# Patient Record
Sex: FEMALE | Race: ASIAN | NOT HISPANIC OR LATINO
[De-identification: names, ages, dates, MRNs, and addresses within clinical notes are randomized per-mention and may not be internally consistent; named-entity substitution may affect disease eponyms.]

---

## 2022-02-28 ENCOUNTER — APPOINTMENT (OUTPATIENT)
Dept: NEUROLOGY | Facility: CLINIC | Age: 67
End: 2022-02-28

## 2022-07-15 ENCOUNTER — APPOINTMENT (OUTPATIENT)
Dept: NEUROLOGY | Facility: CLINIC | Age: 67
End: 2022-07-15

## 2022-07-15 VITALS
DIASTOLIC BLOOD PRESSURE: 76 MMHG | HEART RATE: 55 BPM | BODY MASS INDEX: 24.19 KG/M2 | SYSTOLIC BLOOD PRESSURE: 130 MMHG | WEIGHT: 120 LBS | HEIGHT: 59 IN

## 2022-07-15 DIAGNOSIS — F32.A DEPRESSION, UNSPECIFIED: ICD-10-CM

## 2022-07-15 DIAGNOSIS — I10 ESSENTIAL (PRIMARY) HYPERTENSION: ICD-10-CM

## 2022-07-15 DIAGNOSIS — Z87.891 PERSONAL HISTORY OF NICOTINE DEPENDENCE: ICD-10-CM

## 2022-07-15 DIAGNOSIS — H26.9 UNSPECIFIED CATARACT: ICD-10-CM

## 2022-07-15 DIAGNOSIS — Z83.2 FAMILY HISTORY OF DISEASES OF THE BLOOD AND BLOOD-FORMING ORGANS AND CERTAIN DISORDERS INVOLVING THE IMMUNE MECHANISM: ICD-10-CM

## 2022-07-15 DIAGNOSIS — E78.5 HYPERLIPIDEMIA, UNSPECIFIED: ICD-10-CM

## 2022-07-15 PROCEDURE — 99204 OFFICE O/P NEW MOD 45 MIN: CPT

## 2022-07-15 RX ORDER — PREDNISOLONE ACETATE 10 MG/ML
1 SUSPENSION/ DROPS OPHTHALMIC
Qty: 15 | Refills: 0 | Status: ACTIVE | COMMUNITY
Start: 2022-06-22

## 2022-07-15 RX ORDER — FLUOXETINE HYDROCHLORIDE 20 MG/1
20 CAPSULE ORAL
Qty: 45 | Refills: 0 | Status: ACTIVE | COMMUNITY
Start: 2022-02-04

## 2022-07-15 RX ORDER — SIMVASTATIN 20 MG/1
20 TABLET, FILM COATED ORAL
Qty: 90 | Refills: 0 | Status: ACTIVE | COMMUNITY
Start: 2022-06-13

## 2022-07-15 RX ORDER — KETOROLAC TROMETHAMINE 5 MG/ML
0.5 SOLUTION OPHTHALMIC
Qty: 10 | Refills: 0 | Status: ACTIVE | COMMUNITY
Start: 2022-06-22

## 2022-07-15 RX ORDER — HYDROCHLOROTHIAZIDE 12.5 MG/1
12.5 TABLET ORAL
Qty: 90 | Refills: 0 | Status: ACTIVE | COMMUNITY
Start: 2022-03-11

## 2022-07-15 RX ORDER — METOPROLOL SUCCINATE 25 MG/1
25 TABLET, EXTENDED RELEASE ORAL
Qty: 90 | Refills: 0 | Status: ACTIVE | COMMUNITY
Start: 2022-03-01

## 2022-07-15 RX ORDER — OFLOXACIN 3 MG/ML
0.3 SOLUTION/ DROPS OPHTHALMIC
Qty: 10 | Refills: 0 | Status: ACTIVE | COMMUNITY
Start: 2022-06-22

## 2022-07-15 NOTE — ASSESSMENT
[FreeTextEntry1] : Assessment/Plan:\par  67 year old female with 1 year hx of daily left sided headaches. No inciting event. NO migrainous or autonomic features. \par \par New onset left sided headaches- most likely 2/2 cervicalgia. However, given her age, would like to rule out intracranial pathologies. \par \par Plan:-\par [] MRI brain without contrast and MRA head without contrast to rule out intracranial pathology to explain a secondary headache\par [] Will refer to physical therapy for musculoskeletal neck and shoulder pain\par [] Tylenol / Ibuprofen as needed for pain relief\par [] Can try OTC creams (Icy hot, Aspercreme) over shoulder and neck\par [] Avoid sleeping on the left side\par [] Continue massage therapies\par \par \par Headache education provided:\par [] Stay well hydrated\par [] Limit excessive caffeine and alcohol intake\par [] Maintain good sleep hygiene\par [] Try to avoid triggers; Lifestyle modifications\par [] Practice good eating habits\par [] Avoid excessive use of over the counter pain medications, as they can cause medication overuse headaches \par [] Keep a headache diary\par [] Relaxation techniques, biofeedback, massage therapy, acupunctures, and heating pads may be effective\par \par Return to clinic 3 months\par \par The above plan was discussed with ELOY MARTINEZ in great detail.  ELOY MARTINEZ verbalized understanding and agrees with plan as detailed above. Patient was provided education and counselling on current diagnosis/symptoms, diagnostic work up, treatment options and potential side effects of any prescribed therapy/therapies. She was advised to call our clinic at [phone] for any new or worsening symptoms, or with any questions or concerns. In case of acute onset of neurological symptoms or worsening presentation, patient was advised to present to nearest emergency room for further evaluation. ELOY MARTINEZ expressed understanding and all her questions/concerns were addressed.\par \par Erin Antunez M.D\par

## 2022-07-15 NOTE — PHYSICAL EXAM
[FreeTextEntry1] : PHYSICAL EXAM\par Constitutional: Alert, no acute distress \par Neck: Full range of motion. Tenderness over muscles over left shoulder and left side of neck to palpation\par Psychiatric: appropriate affect and mood\par Pulmonary: No respiratory distress, stable on room air\par \par NEUROLOGICAL EXAM\par Mental status: The patient is alert, attentive, and conversational memory intact\par Speech/language: Clear and fluent with intact comprehension\par Cranial nerves:\par CN II: Visual fields are full to confrontation. Pupil size equal and briskly reactive to light. \par CN III, IV, VI: EOMI, no nystagmus, no ptosis\par CN V: Facial sensation is intact to pinprick in all 3 divisions bilaterally.\par CN VII: Face is symmetric with normal eye closure and smile.\par CN VII: Hearing is normal to rubbing fingers\par CN IX, X: Palate elevates symmetrically. Phonation is normal.\par CN XI: Head turning and shoulder shrug are intact\par CN XII: Tongue is midline with normal movements and no atrophy.\par Motor: Strength is full bilaterally. 5/5 muscle power in bilateral UE and LE.\par Reflexes: Reflexes are 2+ and symmetric at the biceps, knees, and ankles. Plantar responses are flexor.\par Sensory: Intact sensations to light touch in upper and lower extremities. \par Coordination: Rapid alternating movements and fine finger movements are intact. There is no dysmetria on finger-to-nose. There are no abnormal or extraneous movements. \par Gait/Stance: Posture is normal. Gait is steady with normal steps, base, arm swing, and turning.\par \par \par \par

## 2022-07-15 NOTE — HISTORY OF PRESENT ILLNESS
[FreeTextEntry1] : HPI (initial visit Jul 15, 2022)- 68 yo right handed female w/ hx of HTN, HLD, Depression, cataract here for evaluation of headaches. \par \par She reports 1 year hx of left sided headaches. They have been daily, comes and goes through out the day. Tylenol or massage would help. Throbbing/aching pain. No sharp pains. No prior of hx of headaches. No hx of COVID-19 infection. No nausea or vomiting. Pain at back of neck on the left and left shoulder. "I have also been told I have a bad posture". Does wake her up in the middle of night. NO morning headaches. No associated dizziness or vision changes. No known triggers. No unilateral ptosis, eye tearing, runny nose, facial flushing or conjunctival injection. She can function with a headache, pressing on headache helps. "I think I sleep very well". She does snore. Headaches morning in afternoons. \par \par No known family hx of headaches. NO hx of head trauma. \par

## 2022-08-13 ENCOUNTER — APPOINTMENT (OUTPATIENT)
Dept: MRI IMAGING | Facility: CLINIC | Age: 67
End: 2022-08-13

## 2022-08-13 ENCOUNTER — OUTPATIENT (OUTPATIENT)
Dept: OUTPATIENT SERVICES | Facility: HOSPITAL | Age: 67
LOS: 1 days | End: 2022-08-13
Payer: MEDICARE

## 2022-08-13 DIAGNOSIS — R51.9 HEADACHE, UNSPECIFIED: ICD-10-CM

## 2022-08-13 PROCEDURE — 70551 MRI BRAIN STEM W/O DYE: CPT | Mod: 26,MH

## 2022-08-13 PROCEDURE — 70544 MR ANGIOGRAPHY HEAD W/O DYE: CPT

## 2022-08-13 PROCEDURE — 70544 MR ANGIOGRAPHY HEAD W/O DYE: CPT | Mod: 26,59,MH

## 2022-08-13 PROCEDURE — 70551 MRI BRAIN STEM W/O DYE: CPT

## 2022-08-16 ENCOUNTER — TRANSCRIPTION ENCOUNTER (OUTPATIENT)
Age: 67
End: 2022-08-16

## 2022-08-16 ENCOUNTER — NON-APPOINTMENT (OUTPATIENT)
Age: 67
End: 2022-08-16

## 2022-10-13 ENCOUNTER — TRANSCRIPTION ENCOUNTER (OUTPATIENT)
Age: 67
End: 2022-10-13

## 2022-11-04 ENCOUNTER — APPOINTMENT (OUTPATIENT)
Dept: NEUROLOGY | Facility: CLINIC | Age: 67
End: 2022-11-04

## 2022-11-04 VITALS
DIASTOLIC BLOOD PRESSURE: 77 MMHG | BODY MASS INDEX: 24.39 KG/M2 | WEIGHT: 121 LBS | SYSTOLIC BLOOD PRESSURE: 138 MMHG | HEIGHT: 59 IN | HEART RATE: 63 BPM

## 2022-11-04 DIAGNOSIS — R51.9 HEADACHE, UNSPECIFIED: ICD-10-CM

## 2022-11-04 DIAGNOSIS — M54.2 CERVICALGIA: ICD-10-CM

## 2022-11-04 DIAGNOSIS — R23.8 OTHER SKIN CHANGES: ICD-10-CM

## 2022-11-04 PROCEDURE — 99212 OFFICE O/P EST SF 10 MIN: CPT

## 2022-11-04 NOTE — DATA REVIEWED
[de-identified] : MRA H 8/13/2022 with no evidence of aneurysm. There is mild narrowing at distal L ICA and distal R vert.\par \par MRI brain no contrast 8/13/2022 with evidence of subcortical and periventricular white matter lesions, most of it of which could be explained by chronic microvascular ischemic changes (hx of HTN, HLD), however some of the periventricular lesions (ethel R posterior horn of latera ventricule) and confluent left frontal lobe lesion could be more suspicious for an inflammatory/demyelinating condition. She denies any hx of neurological deficits in the past. Will plan to repeat MRI brain w/w/o in 1 year.

## 2022-11-04 NOTE — ASSESSMENT
[FreeTextEntry1] : Assessment/Plan:\par 1. Cervicalgia- significantly better. Headaches now 1/week and better managed. Recommend continuing yoga and stretching exercises. Continue psychotherapy.  Tylenol / Ibuprofen as needed for pain relief. PT was very helpful. \par Can try OTC creams (Icy hot, Aspercreme) over shoulder and neck. Avoid sleeping on the left side. Continue massage therapies.\par Repeat MRI brain w/w/o in 1 year for stability of white matter disease. \par Headache education provided:\par [] Stay well hydrated\par [] Limit excessive caffeine and alcohol intake\par [] Maintain good sleep hygiene. Follow a consistent sleep and wake schedule. \par [] Practice good eating habits. Avoid skipping meals. \par [] Try to avoid any known triggers\par [] Avoid excessive use of over the counter pain medications, as they can cause medication overuse headaches \par [] Keep a headache diary\par [] Relaxation techniques, biofeedback, massage therapy, acupunctures, and heating pads may be effective\par \par 2. Scalp itching- Will refer to dermatology\par \par Return to clinic 3 months\par \par The above plan was discussed with ELOY AMRTINEZ in great detail.  ELOY MARTINEZ verbalized understanding and agrees with plan as detailed above. Patient was provided education and counselling on current diagnosis/symptoms, diagnostic work up, treatment options and potential side effects of any prescribed therapy/therapies. She was advised to call our clinic at [phone] for any new or worsening symptoms, or with any questions or concerns. In case of acute onset of neurological symptoms or worsening presentation, patient was advised to present to nearest emergency room for further evaluation. ELOY MARTINEZ expressed understanding and all her questions/concerns were addressed.\par \par Erin Antunez M.D\par

## 2022-11-04 NOTE — PHYSICAL EXAM
[FreeTextEntry1] : Some limitations in movements of the neck side to side. Tightness felt over muscles around neck/shoulder. \par Strength is full bilaterally. 5/5 muscle power in bilateral UE and LE.\par Reflexes are 2+ and symmetric at the biceps, knees, and ankles. Plantar responses are flexor.\par Intact sensations to light touch in upper and lower extremities. \par Posture is normal. Gait is steady with normal steps, base, arm swing, and turning.

## 2022-11-04 NOTE — HISTORY OF PRESENT ILLNESS
[FreeTextEntry1] : INTERIM HX 11/04/2022: "Headaches are well controlled". Headaches now 1x/week, though mild. GOt a back brace. PT has helped. Doing yoga and exercises. Doing psychotherapy, it helps tremendously. Patient brings up an unrelated issues: scalp itching. \par \par MRA H 8/13/2022 with no evidence of aneurysm. There is mild narrowing at distal L ICA and distal R vert. MRI brain no contrast 8/13/2022 with evidence of subcortical and periventricular white matter lesions, most of it of which could be explained by chronic microvascular ischemic changes (hx of HTN, HLD), however some of the periventricular lesions (ethel R posterior horn of latera/ ventricle) and confluent left frontal lobe lesion could be more suspicious for an inflammatory/demyelinating condition. She denies any hx of neurological deficits in the past. Will plan to repeat MRI brain w/w/o in 1 year.\par \par -----------------------------------------------------\par HPI (initial visit Jul 15, 2022)- 66 yo right handed female w/ hx of HTN, HLD, Depression, cataract here for evaluation of headaches. \par \par She reports 1 year hx of left sided headaches. They have been daily, comes and goes through out the day. Tylenol or massage would help. Throbbing/aching pain. No sharp pains. No prior of hx of headaches. No hx of COVID-19 infection. No nausea or vomiting. Pain at back of neck on the left and left shoulder. "I have also been told I have a bad posture". Does wake her up in the middle of night. NO morning headaches. No associated dizziness or vision changes. No known triggers. No unilateral ptosis, eye tearing, runny nose, facial flushing or conjunctival injection. She can function with a headache, pressing on headache helps. "I think I sleep very well". She does snore. Headaches morning in afternoons. \par \par No known family hx of headaches. NO hx of head trauma. \par

## 2023-12-14 ENCOUNTER — APPOINTMENT (OUTPATIENT)
Dept: NEUROLOGY | Facility: CLINIC | Age: 68
End: 2023-12-14

## 2024-08-05 ENCOUNTER — NON-APPOINTMENT (OUTPATIENT)
Age: 69
End: 2024-08-05

## 2024-08-06 ENCOUNTER — APPOINTMENT (OUTPATIENT)
Dept: ORTHOPEDIC SURGERY | Facility: CLINIC | Age: 69
End: 2024-08-06

## 2024-08-06 PROBLEM — M16.11 ARTHRITIS OF RIGHT HIP: Status: ACTIVE | Noted: 2024-08-06

## 2024-08-06 PROBLEM — M25.551 RIGHT HIP PAIN: Status: ACTIVE | Noted: 2024-08-06

## 2024-08-06 PROCEDURE — 73502 X-RAY EXAM HIP UNI 2-3 VIEWS: CPT | Mod: RT

## 2024-08-06 PROCEDURE — 73564 X-RAY EXAM KNEE 4 OR MORE: CPT | Mod: RT

## 2024-08-06 PROCEDURE — 99204 OFFICE O/P NEW MOD 45 MIN: CPT

## 2024-08-06 NOTE — HISTORY OF PRESENT ILLNESS
[de-identified] : This is very nice 69-year-old female experiencing right hip and groin and thigh down to the knee pain, which is severe in intensity. The pain substantially limits activities of daily living. Walking tolerance is reduced.  She is a cane.  She does not take NSAIDs for this.  The patient denies any radiation of the pain to the feet and it is not associated with numbness, tingling, or weakness. Airway patent, Nasal mucosa clear. Mouth with normal mucosa. Throat has no vesicles, no oropharyngeal exudates and uvula is midline.

## 2024-08-06 NOTE — DISCUSSION/SUMMARY
[de-identified] :  This patient has right hip osteoarthritis that is severe radiating pain down to the knee.  The right knee examination is normal.  The patient is not an appropriate candidate for surgical intervention at this time as she has not yet tried a course of conservative management. An extensive discussion was conducted on the natural history of the disease and the variety of surgical and non-surgical options available to the patient including, but not limited to non-steroidal anti-inflammatory medications, steroid injections, physical therapy, maintenance of ideal body weight, and reduction of activity.  I recommend meloxicam for this diagnosis however she tells me that her primary care physician told her that she is not allowed to take NSAIDs because of her hypertension.  I prescribed a right hip intra-articular cortisone injection to be performed interventional radiology.  Physical therapy prescribed. The patient will schedule an appointment as needed.

## 2024-08-06 NOTE — PHYSICAL EXAM
[de-identified] : Patient is well nourished, well-developed, in no acute distress, with appropriate mood and affect. The patient is oriented to time, place, and person. Respirations are even and unlabored. Gait evaluation reveals a limp. There is no inguinal adenopathy. Examination of the contralateral hip shows normal range of motion, strength, no tenderness, and intact skin. The affected limb is well-perfused, shows a grossly normal motor and sensory examination. Examination of the hip shows no skin lesions. Hip motion is reduced and causes pain. FADIR is positive and LAUREN is positive. Stinchfield test is positive. Leg lengths are approximately equal. Both hips are stable and muscle strength is normal.  Right knee motion is painless and the knee moves from 0 to 135 degrees. The knee is stable within that range-of-motion to AP and ML stress with a 1A Lachman, negative anterior or posterior drawer and no instability to varus or valgus stress. The alignment of the knee is 5 degrees varus. No effusion or crepitus is noted. No tenderness to palpation about the medial or lateral joint line, medial or lateral tibial plateau, medial or lateral femoral condyle, medial or lateral patellar facets, superior or inferior pole of the patella. Leilani's is negative. Muscle strength is normal. Pedal pulses are palpable.  [de-identified] :  AP pelvis, AP and lateral hip radiographs of the right hip were ordered and taken in the office and demonstrate degenerative joint disease of the hip with joint space narrowing, osteophyte formation, and subchondral sclerosis.  Long standing knee, AP knee, lateral knee, and patellar views of the right knee were ordered and taken in the office and demonstrate no evidence of degenerative joint disease of the knee, fractures, intra-articular pathology.

## 2024-08-11 ENCOUNTER — NON-APPOINTMENT (OUTPATIENT)
Age: 69
End: 2024-08-11

## 2024-08-12 ENCOUNTER — APPOINTMENT (OUTPATIENT)
Dept: SPINE | Facility: CLINIC | Age: 69
End: 2024-08-12
Payer: MEDICARE

## 2024-08-12 VITALS
WEIGHT: 122 LBS | HEIGHT: 59 IN | HEART RATE: 57 BPM | SYSTOLIC BLOOD PRESSURE: 126 MMHG | DIASTOLIC BLOOD PRESSURE: 69 MMHG | OXYGEN SATURATION: 96 % | BODY MASS INDEX: 24.6 KG/M2

## 2024-08-12 DIAGNOSIS — G89.29 LOW BACK PAIN, UNSPECIFIED: ICD-10-CM

## 2024-08-12 DIAGNOSIS — M54.50 LOW BACK PAIN, UNSPECIFIED: ICD-10-CM

## 2024-08-12 PROCEDURE — 99203 OFFICE O/P NEW LOW 30 MIN: CPT

## 2024-08-12 NOTE — PHYSICAL EXAM
[General Appearance - Alert] : alert [General Appearance - In No Acute Distress] : in no acute distress [Oriented To Time, Place, And Person] : oriented to person, place, and time [Cranial Nerves Optic (II)] : visual acuity intact bilaterally,  pupils equal round and reactive to light [Cranial Nerves Oculomotor (III)] : extraocular motion intact [Cranial Nerves Facial (VII)] : face symmetrical [Cranial Nerves Vestibulocochlear (VIII)] : hearing was intact bilaterally [Cranial Nerves Accessory (XI - Cranial And Spinal)] : head turning and shoulder shrug symmetric [Cranial Nerves Hypoglossal (XII)] : there was no tongue deviation with protrusion [Motor Tone] : muscle tone was normal in all four extremities [Motor Strength] : muscle strength was normal in all four extremities [Sensation Tactile Decrease] : light touch was intact [Balance] : balance was intact [No Visual Abnormalities] : no visible abnormailities [Able to toe walk] : the patient was able to toe walk [Able to heel walk] : the patient was able to heel walk [Sclera] : the sclera and conjunctiva were normal [PERRL With Normal Accommodation] : pupils were equal in size, round, reactive to light, with normal accommodation [Extraocular Movements] : extraocular movements were intact [Outer Ear] : the ears and nose were normal in appearance [Hearing Threshold Finger Rub Not [place]] : hearing was normal [Neck Appearance] : the appearance of the neck was normal [] : no respiratory distress [Exaggerated Use Of Accessory Muscles For Inspiration] : no accessory muscle use [Abnormal Walk] : normal gait [Skin Color & Pigmentation] : normal skin color and pigmentation [Straight-Leg Raise Test - Left] : straight leg raise of the left leg was negative [Straight-Leg Raise Test - Right] : straight leg raise  of the right leg was negative

## 2024-08-12 NOTE — HISTORY OF PRESENT ILLNESS
[de-identified] : 69-year-old female with history of right hip osteoarthritis radiating down the knee for which she has been following up with ortho. She deemed not to be a surgical candidate per ortho and was recommended conservative therapy. She has been having difficulty walking since 02/2024. Lower back pain, right knee and leg pain. Had recent x-ray per her PCP and was told to see spine specialist. No weakness, numbness, saddle anesthesia, bladder or bowel dysfunction. She is using a cane to ambulate. She is also scheduled for R hip replacement at some point.  A recent lumbar spine x-ray shows degenerative disc disease and a slight listhesis of L4 and L5.

## 2024-08-12 NOTE — ASSESSMENT
[FreeTextEntry1] : 70 y/o F, hx of R hip OA radiating down the knee for which she has been following up with ortho. She presents today for neurosurgical evaluation of lower back pain. No leg pain, weakness or numbness. Neuro intact.  She is having hip replacement in 6 months. X-ray with degenerative disc disease.  Recommended to follow-up after hip replacement if pain is not relieved.

## 2024-08-22 ENCOUNTER — APPOINTMENT (OUTPATIENT)
Dept: ORTHOPEDIC SURGERY | Facility: CLINIC | Age: 69
End: 2024-08-22

## 2024-08-26 ENCOUNTER — RESULT REVIEW (OUTPATIENT)
Age: 69
End: 2024-08-26

## 2024-08-26 ENCOUNTER — OUTPATIENT (OUTPATIENT)
Dept: OUTPATIENT SERVICES | Facility: HOSPITAL | Age: 69
LOS: 1 days | End: 2024-08-26
Payer: MEDICARE

## 2024-08-26 ENCOUNTER — APPOINTMENT (OUTPATIENT)
Dept: RADIOLOGY | Facility: CLINIC | Age: 69
End: 2024-08-26
Payer: MEDICARE

## 2024-08-26 DIAGNOSIS — M16.11 UNILATERAL PRIMARY OSTEOARTHRITIS, RIGHT HIP: ICD-10-CM

## 2024-08-26 PROCEDURE — 73525 CONTRAST X-RAY OF HIP: CPT

## 2024-08-26 PROCEDURE — 27093 INJECTION FOR HIP X-RAY: CPT

## 2024-08-26 PROCEDURE — 73525 CONTRAST X-RAY OF HIP: CPT | Mod: 26,RT

## 2024-08-26 PROCEDURE — 27093 INJECTION FOR HIP X-RAY: CPT | Mod: RT
